# Patient Record
(demographics unavailable — no encounter records)

---

## 2024-10-08 NOTE — ASSESSMENT
[FreeTextEntry1] : Impression is that of migraine headaches and left lumbar radiculopathy. I ordered MRI of the brain and EMG of the lower extremities. She already had MRI of the lumbar spine in 2020. I gave her topamax 100 mg qd for prophylaxis and relpax 40 mg PRN for abortive therapy. We will see her back in follow up in 1 month.     Total clinician time spent today on the patient is 45 minutes including preparing to see the patient, obtaining and/or reviewing and confirming history, performing medically necessary and appropriate examination, counseling and educating the patient and/or family, documenting clinical information in the EHR and communicating and/or referring to other healthcare professionals.   Entered by Feli Oneill acting as scribe for Dr. Damon.   The documentation recorded by the scribe, in my presence, accurately reflects the service I personally performed, and the decisions made by me with my edits as appropriate. Steve Damon MD, FAAN, FACP Diplomate [employer].

## 2024-10-08 NOTE — HISTORY OF PRESENT ILLNESS
[FreeTextEntry1] : Ms. Turner is a 30-year-old woman who presents today in neurologic consultation for two complaints. The first is lower back pain with radiation down the left leg. She saw me for this in  after an epidural anesthesia injection for  section. Her left leg jumped involuntarily after being injected. Since then, she has had the pain radiating posteriorly down the left leg to the heel. When she gets up from a seated position or from bed, she has pain in the same distribution. Ibuprofen helps slightly. She had an MRI of the lumbar spine in  which only showed mild bulging disc at L5-S1.   The other symptom is headaches, which she has had for years. She averages 1-2 headaches a week. They typically occur in the temples or various areas of the scalp. They last 3-4 hours or sometimes throughout the night. She has photophobia and nausea with the headaches. They are throbbing and pounding in nature. Ibuprofen only helps slightly. She has never had MRI of the brain.